# Patient Record
Sex: MALE | Race: WHITE | Employment: UNEMPLOYED | URBAN - METROPOLITAN AREA
[De-identification: names, ages, dates, MRNs, and addresses within clinical notes are randomized per-mention and may not be internally consistent; named-entity substitution may affect disease eponyms.]

---

## 2018-01-01 ENCOUNTER — APPOINTMENT (OUTPATIENT)
Dept: GENERAL RADIOLOGY | Age: 83
DRG: 689 | End: 2018-01-01
Payer: MEDICARE

## 2018-01-01 ENCOUNTER — APPOINTMENT (OUTPATIENT)
Dept: CT IMAGING | Age: 83
DRG: 689 | End: 2018-01-01
Payer: MEDICARE

## 2018-01-01 ENCOUNTER — HOSPITAL ENCOUNTER (INPATIENT)
Age: 83
LOS: 2 days | DRG: 689 | End: 2018-09-28
Attending: EMERGENCY MEDICINE | Admitting: INTERNAL MEDICINE
Payer: MEDICARE

## 2018-01-01 VITALS
TEMPERATURE: 97.9 F | BODY MASS INDEX: 16.12 KG/M2 | OXYGEN SATURATION: 100 % | SYSTOLIC BLOOD PRESSURE: 174 MMHG | DIASTOLIC BLOOD PRESSURE: 94 MMHG | HEART RATE: 50 BPM | WEIGHT: 94.4 LBS | HEIGHT: 64 IN | RESPIRATION RATE: 14 BRPM

## 2018-01-01 DIAGNOSIS — N39.0 URINARY TRACT INFECTION WITH HEMATURIA, SITE UNSPECIFIED: ICD-10-CM

## 2018-01-01 DIAGNOSIS — R26.81 GAIT INSTABILITY: ICD-10-CM

## 2018-01-01 DIAGNOSIS — R53.1 GENERALIZED WEAKNESS: Primary | ICD-10-CM

## 2018-01-01 DIAGNOSIS — R31.9 URINARY TRACT INFECTION WITH HEMATURIA, SITE UNSPECIFIED: ICD-10-CM

## 2018-01-01 LAB
ALBUMIN SERPL-MCNC: 3.3 G/DL (ref 3.5–5.2)
ALP BLD-CCNC: 54 U/L (ref 40–129)
ALT SERPL-CCNC: 7 U/L (ref 0–40)
ANION GAP SERPL CALCULATED.3IONS-SCNC: 10 MMOL/L (ref 7–16)
AST SERPL-CCNC: 28 U/L (ref 0–39)
BACTERIA: ABNORMAL /HPF
BASOPHILS ABSOLUTE: 0.01 E9/L (ref 0–0.2)
BASOPHILS RELATIVE PERCENT: 0.2 % (ref 0–2)
BILIRUB SERPL-MCNC: 0.4 MG/DL (ref 0–1.2)
BILIRUBIN DIRECT: <0.2 MG/DL (ref 0–0.3)
BILIRUBIN URINE: ABNORMAL
BILIRUBIN, INDIRECT: ABNORMAL MG/DL (ref 0–1)
BLOOD, URINE: ABNORMAL
BUN BLDV-MCNC: 31 MG/DL (ref 8–23)
CALCIUM SERPL-MCNC: 8.8 MG/DL (ref 8.6–10.2)
CHLORIDE BLD-SCNC: 104 MMOL/L (ref 98–107)
CK MB: 4 NG/ML (ref 0–7.7)
CLARITY: ABNORMAL
CO2: 27 MMOL/L (ref 22–29)
COLOR: ABNORMAL
CREAT SERPL-MCNC: 0.9 MG/DL (ref 0.7–1.2)
EKG ATRIAL RATE: 267 BPM
EKG Q-T INTERVAL: 506 MS
EKG QRS DURATION: 184 MS
EKG QTC CALCULATION (BAZETT): 461 MS
EKG R AXIS: 95 DEGREES
EKG T AXIS: -97 DEGREES
EKG VENTRICULAR RATE: 50 BPM
EOSINOPHILS ABSOLUTE: 0.02 E9/L (ref 0.05–0.5)
EOSINOPHILS RELATIVE PERCENT: 0.3 % (ref 0–6)
EPITHELIAL CELLS, UA: ABNORMAL /HPF
GFR AFRICAN AMERICAN: >60
GFR NON-AFRICAN AMERICAN: >60 ML/MIN/1.73
GLUCOSE BLD-MCNC: 119 MG/DL (ref 74–109)
GLUCOSE URINE: NEGATIVE MG/DL
HCT VFR BLD CALC: 40.7 % (ref 37–54)
HEMOGLOBIN: 12.6 G/DL (ref 12.5–16.5)
IMMATURE GRANULOCYTES #: 0.02 E9/L
IMMATURE GRANULOCYTES %: 0.3 % (ref 0–5)
INR BLD: 1.2
KETONES, URINE: ABNORMAL MG/DL
LEUKOCYTE ESTERASE, URINE: ABNORMAL
LYMPHOCYTES ABSOLUTE: 0.92 E9/L (ref 1.5–4)
LYMPHOCYTES RELATIVE PERCENT: 14 % (ref 20–42)
MAGNESIUM: 2.2 MG/DL (ref 1.6–2.6)
MCH RBC QN AUTO: 29.7 PG (ref 26–35)
MCHC RBC AUTO-ENTMCNC: 31 % (ref 32–34.5)
MCV RBC AUTO: 96 FL (ref 80–99.9)
MONOCYTES ABSOLUTE: 0.47 E9/L (ref 0.1–0.95)
MONOCYTES RELATIVE PERCENT: 7.1 % (ref 2–12)
NEUTROPHILS ABSOLUTE: 5.15 E9/L (ref 1.8–7.3)
NEUTROPHILS RELATIVE PERCENT: 78.1 % (ref 43–80)
NITRITE, URINE: NEGATIVE
PDW BLD-RTO: 13.2 FL (ref 11.5–15)
PH UA: 6 (ref 5–9)
PLATELET # BLD: 228 E9/L (ref 130–450)
PMV BLD AUTO: 9.5 FL (ref 7–12)
POTASSIUM SERPL-SCNC: 4.4 MMOL/L (ref 3.5–5)
POTASSIUM SERPL-SCNC: 6.4 MMOL/L (ref 3.5–5)
PREALBUMIN: 11 MG/DL (ref 20–40)
PROTEIN UA: 30 MG/DL
PROTHROMBIN TIME: 13.8 SEC (ref 9.3–12.4)
RBC # BLD: 4.24 E12/L (ref 3.8–5.8)
RBC UA: ABNORMAL /HPF (ref 0–2)
SODIUM BLD-SCNC: 141 MMOL/L (ref 132–146)
SPECIFIC GRAVITY UA: 1.02 (ref 1–1.03)
TOTAL CK: 139 U/L (ref 20–200)
TOTAL PROTEIN: 7.1 G/DL (ref 6.4–8.3)
TROPONIN: 0.08 NG/ML (ref 0–0.03)
URINE CULTURE, ROUTINE: NORMAL
UROBILINOGEN, URINE: 1 E.U./DL
WBC # BLD: 6.6 E9/L (ref 4.5–11.5)
WBC UA: >20 /HPF (ref 0–5)

## 2018-01-01 PROCEDURE — 6370000000 HC RX 637 (ALT 250 FOR IP): Performed by: EMERGENCY MEDICINE

## 2018-01-01 PROCEDURE — 2500000003 HC RX 250 WO HCPCS: Performed by: INTERNAL MEDICINE

## 2018-01-01 PROCEDURE — 71045 X-RAY EXAM CHEST 1 VIEW: CPT

## 2018-01-01 PROCEDURE — 1200000000 HC SEMI PRIVATE

## 2018-01-01 PROCEDURE — 6360000002 HC RX W HCPCS: Performed by: EMERGENCY MEDICINE

## 2018-01-01 PROCEDURE — 74230 X-RAY XM SWLNG FUNCJ C+: CPT

## 2018-01-01 PROCEDURE — 2580000003 HC RX 258: Performed by: EMERGENCY MEDICINE

## 2018-01-01 PROCEDURE — 83735 ASSAY OF MAGNESIUM: CPT

## 2018-01-01 PROCEDURE — 92611 MOTION FLUOROSCOPY/SWALLOW: CPT

## 2018-01-01 PROCEDURE — 85610 PROTHROMBIN TIME: CPT

## 2018-01-01 PROCEDURE — 36415 COLL VENOUS BLD VENIPUNCTURE: CPT

## 2018-01-01 PROCEDURE — 6360000002 HC RX W HCPCS: Performed by: INTERNAL MEDICINE

## 2018-01-01 PROCEDURE — 2060000000 HC ICU INTERMEDIATE R&B

## 2018-01-01 PROCEDURE — 93005 ELECTROCARDIOGRAM TRACING: CPT | Performed by: EMERGENCY MEDICINE

## 2018-01-01 PROCEDURE — 6370000000 HC RX 637 (ALT 250 FOR IP): Performed by: INTERNAL MEDICINE

## 2018-01-01 PROCEDURE — 2580000003 HC RX 258: Performed by: INTERNAL MEDICINE

## 2018-01-01 PROCEDURE — 82550 ASSAY OF CK (CPK): CPT

## 2018-01-01 PROCEDURE — 87088 URINE BACTERIA CULTURE: CPT

## 2018-01-01 PROCEDURE — 85025 COMPLETE CBC W/AUTO DIFF WBC: CPT

## 2018-01-01 PROCEDURE — 82553 CREATINE MB FRACTION: CPT

## 2018-01-01 PROCEDURE — 84134 ASSAY OF PREALBUMIN: CPT

## 2018-01-01 PROCEDURE — 80048 BASIC METABOLIC PNL TOTAL CA: CPT

## 2018-01-01 PROCEDURE — 84484 ASSAY OF TROPONIN QUANT: CPT

## 2018-01-01 PROCEDURE — 81001 URINALYSIS AUTO W/SCOPE: CPT

## 2018-01-01 PROCEDURE — 70450 CT HEAD/BRAIN W/O DYE: CPT

## 2018-01-01 PROCEDURE — 99285 EMERGENCY DEPT VISIT HI MDM: CPT

## 2018-01-01 PROCEDURE — 84132 ASSAY OF SERUM POTASSIUM: CPT

## 2018-01-01 PROCEDURE — 80076 HEPATIC FUNCTION PANEL: CPT

## 2018-01-01 RX ORDER — SODIUM CHLORIDE 0.9 % (FLUSH) 0.9 %
10 SYRINGE (ML) INJECTION EVERY 12 HOURS SCHEDULED
Status: DISCONTINUED | OUTPATIENT
Start: 2018-01-01 | End: 2018-01-01 | Stop reason: HOSPADM

## 2018-01-01 RX ORDER — ACETAMINOPHEN 650 MG/1
650 SUPPOSITORY RECTAL EVERY 4 HOURS PRN
Status: DISCONTINUED | OUTPATIENT
Start: 2018-01-01 | End: 2018-01-01 | Stop reason: HOSPADM

## 2018-01-01 RX ORDER — SODIUM CHLORIDE 9 MG/ML
INJECTION, SOLUTION INTRAVENOUS CONTINUOUS
Status: DISCONTINUED | OUTPATIENT
Start: 2018-01-01 | End: 2018-01-01 | Stop reason: HOSPADM

## 2018-01-01 RX ORDER — ASPIRIN 325 MG
325 TABLET ORAL ONCE
Status: DISCONTINUED | OUTPATIENT
Start: 2018-01-01 | End: 2018-01-01 | Stop reason: HOSPADM

## 2018-01-01 RX ORDER — AMOXICILLIN AND CLAVULANATE POTASSIUM 875; 125 MG/1; MG/1
1 TABLET, FILM COATED ORAL 2 TIMES DAILY
COMMUNITY

## 2018-01-01 RX ORDER — 0.9 % SODIUM CHLORIDE 0.9 %
500 INTRAVENOUS SOLUTION INTRAVENOUS ONCE
Status: COMPLETED | OUTPATIENT
Start: 2018-01-01 | End: 2018-01-01

## 2018-01-01 RX ORDER — MORPHINE SULFATE 20 MG/ML
5 SOLUTION ORAL EVERY 4 HOURS PRN
Status: DISCONTINUED | OUTPATIENT
Start: 2018-01-01 | End: 2018-01-01 | Stop reason: HOSPADM

## 2018-01-01 RX ORDER — AMLODIPINE BESYLATE 5 MG/1
5 TABLET ORAL DAILY
Status: DISCONTINUED | OUTPATIENT
Start: 2018-01-01 | End: 2018-01-01 | Stop reason: HOSPADM

## 2018-01-01 RX ORDER — CARVEDILOL 3.12 MG/1
3.12 TABLET ORAL DAILY
COMMUNITY

## 2018-01-01 RX ORDER — HYDRALAZINE HYDROCHLORIDE 20 MG/ML
10 INJECTION INTRAMUSCULAR; INTRAVENOUS EVERY 6 HOURS PRN
Status: DISCONTINUED | OUTPATIENT
Start: 2018-01-01 | End: 2018-01-01 | Stop reason: HOSPADM

## 2018-01-01 RX ORDER — SODIUM CHLORIDE 0.9 % (FLUSH) 0.9 %
10 SYRINGE (ML) INJECTION PRN
Status: DISCONTINUED | OUTPATIENT
Start: 2018-01-01 | End: 2018-01-01 | Stop reason: HOSPADM

## 2018-01-01 RX ORDER — ACETAMINOPHEN 325 MG/1
650 TABLET ORAL EVERY 4 HOURS PRN
Status: DISCONTINUED | OUTPATIENT
Start: 2018-01-01 | End: 2018-01-01

## 2018-01-01 RX ADMIN — BARIUM SULFATE 45 G: 0.6 CREAM ORAL at 14:49

## 2018-01-01 RX ADMIN — CEFTRIAXONE SODIUM 1 G: 1 INJECTION, POWDER, FOR SOLUTION INTRAMUSCULAR; INTRAVENOUS at 05:41

## 2018-01-01 RX ADMIN — SODIUM CHLORIDE: 9 INJECTION, SOLUTION INTRAVENOUS at 08:58

## 2018-01-01 RX ADMIN — BARIUM SULFATE 115 ML: 960 POWDER, FOR SUSPENSION ORAL at 14:48

## 2018-01-01 RX ADMIN — Medication 10 ML: at 08:58

## 2018-01-01 RX ADMIN — SODIUM CHLORIDE 500 ML: 9 INJECTION, SOLUTION INTRAVENOUS at 02:46

## 2018-01-01 RX ADMIN — SODIUM CHLORIDE 50 ML/HR: 9 INJECTION, SOLUTION INTRAVENOUS at 01:16

## 2018-01-01 RX ADMIN — MORPHINE SULFATE 5 MG: 100 SOLUTION ORAL at 18:04

## 2018-01-01 RX ADMIN — MORPHINE SULFATE 5 MG: 100 SOLUTION ORAL at 00:16

## 2018-01-01 RX ADMIN — ENOXAPARIN SODIUM 30 MG: 100 INJECTION SUBCUTANEOUS at 11:03

## 2018-01-01 RX ADMIN — SODIUM CHLORIDE: 9 INJECTION, SOLUTION INTRAVENOUS at 00:15

## 2018-01-01 RX ADMIN — HYDRALAZINE HYDROCHLORIDE 10 MG: 20 INJECTION INTRAMUSCULAR; INTRAVENOUS at 13:31

## 2018-01-01 RX ADMIN — CEFTRIAXONE SODIUM 1 G: 1 INJECTION, POWDER, FOR SOLUTION INTRAMUSCULAR; INTRAVENOUS at 06:38

## 2018-01-01 RX ADMIN — BARIUM SULFATE 45 G: 0.6 CREAM ORAL at 14:48

## 2018-01-01 RX ADMIN — BARIUM SULFATE 45 G: 0.6 CREAM ORAL at 15:24

## 2018-01-01 RX ADMIN — HYDRALAZINE HYDROCHLORIDE 10 MG: 20 INJECTION INTRAMUSCULAR; INTRAVENOUS at 00:20

## 2018-01-01 RX ADMIN — ENOXAPARIN SODIUM 30 MG: 100 INJECTION SUBCUTANEOUS at 08:58

## 2018-01-01 RX ADMIN — BARIUM SULFATE 115 ML: 960 POWDER, FOR SUSPENSION ORAL at 15:24

## 2018-01-01 ASSESSMENT — PAIN DESCRIPTION - PAIN TYPE
TYPE: CHRONIC PAIN
TYPE: CHRONIC PAIN

## 2018-01-01 ASSESSMENT — PAIN DESCRIPTION - FREQUENCY
FREQUENCY: INTERMITTENT
FREQUENCY: INTERMITTENT

## 2018-01-01 ASSESSMENT — PAIN SCALES - WONG BAKER
WONGBAKER_NUMERICALRESPONSE: 2
WONGBAKER_NUMERICALRESPONSE: 2

## 2018-01-01 ASSESSMENT — PAIN DESCRIPTION - DESCRIPTORS
DESCRIPTORS: ACHING;DISCOMFORT;DULL
DESCRIPTORS: ACHING;DISCOMFORT;DULL

## 2018-01-01 ASSESSMENT — PAIN DESCRIPTION - LOCATION
LOCATION: SHOULDER
LOCATION: SHOULDER

## 2018-01-01 ASSESSMENT — PAIN SCALES - GENERAL
PAINLEVEL_OUTOF10: 6
PAINLEVEL_OUTOF10: 6

## 2018-01-01 ASSESSMENT — PAIN DESCRIPTION - ORIENTATION
ORIENTATION: LEFT
ORIENTATION: LEFT

## 2018-09-26 PROBLEM — R53.1 GENERALIZED WEAKNESS: Status: ACTIVE | Noted: 2018-01-01

## 2018-09-26 NOTE — PROGRESS NOTES
Dr. Enrique Daniel,    Please note: Your patient is on a medication that requires a renal dose adjustment. Renal Function Assessment:    Date Body Weight IBW Adj. Body Weight Scr CrCl Dialysis status   9/26/2018 42.8 kg N/a N/a 0.9 20 ml/min N/a       Pharmacy has renally dose-adjusted the following medication(s):    Date Medication Original Dosing Regimen New Dosing Regimen   9/26/2018 Lovenox 40 mg SUB-Q daily 30 mg SUB-Q daily           These changes were made per protocol according to the [facility] Renal Function-Based Dose Adjustments Policy    *Please note this dose may need readjusted if your patient's renal function significantly improves. Please contact pharmacy with any questions regarding these changes.     Rhoda Quach RPh 9/26/2018 8:20 AM

## 2018-09-26 NOTE — ED PROVIDER NOTES
Protime-INR   Result Value Ref Range    Protime 13.8 (H) 9.3 - 12.4 sec    INR 1.2    Magnesium   Result Value Ref Range    Magnesium 2.2 1.6 - 2.6 mg/dL   Urinalysis   Result Value Ref Range    Color, UA DKYELLOW Straw/Yellow    Clarity, UA CLOUDY (A) Clear    Glucose, Ur Negative Negative mg/dL    Bilirubin Urine SMALL (A) Negative    Ketones, Urine TRACE (A) Negative mg/dL    Specific Gravity, UA 1.025 1.005 - 1.030    Blood, Urine MODERATE (A) Negative    pH, UA 6.0 5.0 - 9.0    Protein, UA 30 (A) Negative mg/dL    Urobilinogen, Urine 1.0 <2.0 E.U./dL    Nitrite, Urine Negative Negative    Leukocyte Esterase, Urine MODERATE (A) Negative   Microscopic Urinalysis   Result Value Ref Range    WBC, UA >20 0 - 5 /HPF    RBC, UA 10-20 (A) 0 - 2 /HPF    Epi Cells FEW /HPF    Bacteria, UA MODERATE (A) /HPF   EKG 12 Lead   Result Value Ref Range    Ventricular Rate 50 BPM    Atrial Rate 267 BPM    QRS Duration 184 ms    Q-T Interval 506 ms    QTc Calculation (Bazett) 461 ms    R Axis 95 degrees    T Axis -97 degrees       RADIOLOGY:  Interpreted by Radiologist.  CT Head WO Contrast   Final Result   1. Senescent changes commensurate patient's age, multifocal remote lacunes. No    bleed. No definite CT evidence of acute territorial lobar infarction. 2. If there remains clinical concern, particularly for acute CVA, could further    evaluate with MRI. This report has been electronically signed by Desmond Gottron MD.      XR CHEST PORTABLE    (Results Pending)       EKG Interpretation    EKG:Ventricular paced rhythm with a rate of 50 bpm, no acute ischemic change, no ectopy      ------------------------- NURSING NOTES AND VITALS REVIEWED ---------------------------   The nursing notes within the ED encounter and vital signs as below have been reviewed by myself.   BP (!) 190/62   Pulse 50   Temp 97.5 °F (36.4 °C)   Resp 20   Ht 5' 4\" (1.626 m)   Wt 110 lb (49.9 kg)   SpO2 100%   BMI 18.88 kg/m²   Oxygen by me in its entirety. I confirm that the note above accurately reflects all work, treatment, procedures, and medical decision making performed by me.            Mary Escobedo MD  09/26/18 [id number]

## 2018-09-26 NOTE — PROGRESS NOTES
PROCEDURE     Consistencies Administered During the Evaluation   Liquids: [x]Thin    []Nectar  [x]Honey   Solids:  [x]Pureed/Pudding  []Soft Solid  []Cookie   Other:       Method of Intake:   []Cup   [x]Spoon  []Straw  []Self Fed  [x]Fed by Clinician     Position:   [x]Upright seated  []Upright Standing  []Supine, elevated 45°   []Anterior/Posterior  [x]Lateral   []Oblique                   RESULTS     ORAL STAGE []Functional  [x]Abnormal      [] Dentition: ([]natural  []missing teeth []edentulous []partials []other )     [] Inadequate labial seal resulting anterior labial spillage from ([]right[] left   []midline )     [] Decreased mastication due to ([]poor/missing dentition     []decreased lingual control []cognitive status)      [x] Delayed A-P transit due to ([]decreased initiation    [x] reduced lingual strength[x]cognitive function )     [] Decreased bolus formation resulting in premature pharyngeal spillage      [x] Oral residuals:    []anterior sulcus  []sub lingually         []right buccal cavity  []left buccal cavity            []on tongue base       [x]throughout oral cavity       []on superior tongue   []on palate        []on velum          []Comments:        PHARYNGEAL STAGE     [] Functional  [x]Abnormal       ONSET TIME    [] Onset time of the pharyngeal swallow was adequate      [x] Delayed initiation of the pharyngeal swallow      ([x]mild []moderate []marked []severe []absent ).         Swallow reflex was triggered at: ([x]tongue base []valleculae []pyriform sinus)      PHARYNGEAL RESIDUALS          Vallecula/Pharyngeal Wall    []No significant residuals were noted in the vallecula      [x]Reduced tongue base retraction resulting in:      [x]residuals in vallecula      []residual on posterior pharyngeal wall      These residuals were noted for []thin []nectar[x] honey [x]pureed []solid)        Which []cleared [x]did not clear []partially cleared []mostly cleared       With [x]cued laryngeal closure        which  []cleared from the laryngeal vestibule spontaneously (transient)     []cleared from the laryngeal vestibule with a cued, re-directive throat clear       [x]remained in the laryngeal vestibule. [x]penetrated deep into the laryngeal vestibule (to the level of the true vocal folds)        Laryngeal penetration was  []trace [x]mild []moderate []marked []severe        and occurred []inconsistently  [x]consistently []only with use of a straw. [x]AFTER the swallow for []thin []nectar [x]honey[x] pureed []solid)          due to: [x]pharyngeal residual       []redirection of bolus from the esophagus        which  []cleared from the laryngeal vestibule spontaneously(transient)     []cleared from the laryngeal vestibule with a cued, re-directive throat clear       [x]remained in the laryngeal vestibule.       [x]penetrated deep into the laryngeal vestibule (to the level of the true vocal folds)      Laryngeal penetration was  []trace [x]mild []moderate []marked []severe )       and occurred []inconsistently  [x]consistently []only with use of a straw       In response to laryngeal penetration []A  spontaneous cough was noted        [] Spontaneous throat clearing was noted        [] An inconsistent cough was noted        [] A delayed cough was noted        [x]an absent cough/throat clear was noted       ASPIRATION    [x]Aspiration was not present during this evaluation however there is high risk for aspiration due to laryngeal penetration and pharyngeal residuals      []Aspiration was present during this evaluation:          []BEFORE the swallow for []thin []nectar []honey[] pureed []solid           due to: []premature pharyngeal entry []delayed pharyngeal swallow          aspiration was  []trace []mild []moderate []marked []severe          and occurred []inconsistently  []consistently []only with use of a straw       []DURING  the swallow for []thin []nectar []honey[] pureed warranted. []  Patient stated goals:   []  Treatment goals discussed with [] patient/  [] family. []  The []  patient/ []  family understand the diagnosis, prognosis and plan of care. [x]The admitting diagnosis and active problem list, as listed below have been reviewed prior to initiation of this evaluation.      ADMITTING DIAGNOSIS: Generalized weakness [R53.1]  Generalized weakness [R53.1]     ACTIVE PROBLEM LIST:   Patient Active Problem List   Diagnosis    Rosario-Worthy tear    Hiatal hernia    Coronary artery disease    Upper gastrointestinal bleeding    Hypertension    Hyperlipidemia    Hypothyroidism    Urinary tract infection    Generalized weakness

## 2018-09-27 NOTE — PROGRESS NOTES
PROCEDURE     Consistencies Administered During the Evaluation   Liquids: []Thin    []Nectar  [x]Honey   Solids:  [x]Pureed/Pudding  []Soft Solid  []Cookie   Other:       Method of Intake:   []Cup   [x]Spoon  []Straw  []Self Fed  [x]Fed by Clinician     Position:   [x]Upright seated  []Upright Standing  []Supine, elevated 45°   []Anterior/Posterior  [x]Lateral   []Oblique                   RESULTS     ORAL STAGE []Functional  [x]Abnormal      [] Dentition: ([]natural  []missing teeth []edentulous []partials []other )     [] Inadequate labial seal resulting anterior labial spillage from ([]right[] left   []midline )     [] Decreased mastication due to ([]poor/missing dentition     []decreased lingual control []cognitive status)      [x] Delayed A-P transit due to ([]decreased initiation    [x] reduced lingual strength[x]cognitive function )     [] Decreased bolus formation resulting in premature pharyngeal spillage      [x] Oral residuals:    []anterior sulcus  []sub lingually         []right buccal cavity  []left buccal cavity            []on tongue base       [x]throughout oral cavity       []on superior tongue   []on palate        []on velum          []Comments:        PHARYNGEAL STAGE     [] Functional  [x]Abnormal       ONSET TIME    [] Onset time of the pharyngeal swallow was adequate      [x] Delayed initiation of the pharyngeal swallow      ([x]mild []moderate []marked []severe []absent ).         Swallow reflex was triggered at: ([x]tongue base []valleculae []pyriform sinus)      PHARYNGEAL RESIDUALS          Vallecula/Pharyngeal Wall    []No significant residuals were noted in the vallecula      [x]Reduced tongue base retraction resulting in:      [x]residuals in vallecula      []residual on posterior pharyngeal wall      These residuals were noted for []thin []nectar[x] honey [x]pureed []solid)        Which []cleared [x]did not clear []partially cleared []mostly cleared       With [x]cued

## 2018-09-27 NOTE — PROGRESS NOTES
Pharmacy called and stated we will not get the clonidine patch until later since its coming from [address] [place] [address], Dr. Chloe Benton ordered prn hydralazine in the mean time.